# Patient Record
Sex: FEMALE | Race: WHITE | NOT HISPANIC OR LATINO
[De-identification: names, ages, dates, MRNs, and addresses within clinical notes are randomized per-mention and may not be internally consistent; named-entity substitution may affect disease eponyms.]

---

## 2018-06-23 ENCOUNTER — TRANSCRIPTION ENCOUNTER (OUTPATIENT)
Age: 58
End: 2018-06-23

## 2018-06-23 ENCOUNTER — INPATIENT (INPATIENT)
Facility: HOSPITAL | Age: 58
LOS: 1 days | Discharge: HOME | DRG: 358 | End: 2018-06-25
Attending: SURGERY | Admitting: SURGERY
Payer: COMMERCIAL

## 2018-06-23 VITALS
SYSTOLIC BLOOD PRESSURE: 121 MMHG | WEIGHT: 190.04 LBS | RESPIRATION RATE: 16 BRPM | DIASTOLIC BLOOD PRESSURE: 72 MMHG | HEART RATE: 115 BPM | HEIGHT: 65 IN | OXYGEN SATURATION: 97 % | TEMPERATURE: 99 F

## 2018-06-23 DIAGNOSIS — Z46.51 ENCOUNTER FOR FITTING AND ADJUSTMENT OF GASTRIC LAP BAND: Chronic | ICD-10-CM

## 2018-06-23 LAB
ALBUMIN SERPL ELPH-MCNC: 4.2 G/DL — SIGNIFICANT CHANGE UP (ref 3.3–5)
ALP SERPL-CCNC: 80 U/L — SIGNIFICANT CHANGE UP (ref 30–120)
ALT FLD-CCNC: 30 U/L DA — SIGNIFICANT CHANGE UP (ref 10–60)
AMYLASE P1 CFR SERPL: 84 U/L — SIGNIFICANT CHANGE UP (ref 25–125)
ANION GAP SERPL CALC-SCNC: 13 MMOL/L — SIGNIFICANT CHANGE UP (ref 5–17)
APTT BLD: 61.9 SEC — HIGH (ref 27.5–37.4)
AST SERPL-CCNC: 25 U/L — SIGNIFICANT CHANGE UP (ref 10–40)
BASOPHILS # BLD AUTO: 0.1 K/UL — SIGNIFICANT CHANGE UP (ref 0–0.2)
BASOPHILS NFR BLD AUTO: 0.9 % — SIGNIFICANT CHANGE UP (ref 0–2)
BILIRUB SERPL-MCNC: 0.7 MG/DL — SIGNIFICANT CHANGE UP (ref 0.2–1.2)
BUN SERPL-MCNC: 16 MG/DL — SIGNIFICANT CHANGE UP (ref 7–23)
CALCIUM SERPL-MCNC: 10 MG/DL — SIGNIFICANT CHANGE UP (ref 8.4–10.5)
CHLORIDE SERPL-SCNC: 103 MMOL/L — SIGNIFICANT CHANGE UP (ref 96–108)
CO2 SERPL-SCNC: 24 MMOL/L — SIGNIFICANT CHANGE UP (ref 22–31)
CREAT SERPL-MCNC: 0.89 MG/DL — SIGNIFICANT CHANGE UP (ref 0.5–1.3)
EOSINOPHIL # BLD AUTO: 0 K/UL — SIGNIFICANT CHANGE UP (ref 0–0.5)
EOSINOPHIL NFR BLD AUTO: 0.1 % — SIGNIFICANT CHANGE UP (ref 0–6)
GLUCOSE SERPL-MCNC: 114 MG/DL — HIGH (ref 70–99)
HCT VFR BLD CALC: 41.1 % — SIGNIFICANT CHANGE UP (ref 34.5–45)
HGB BLD-MCNC: 13.4 G/DL — SIGNIFICANT CHANGE UP (ref 11.5–15.5)
INR BLD: 1.09 RATIO — SIGNIFICANT CHANGE UP (ref 0.88–1.16)
LIDOCAIN IGE QN: 116 U/L — SIGNIFICANT CHANGE UP (ref 73–393)
LYMPHOCYTES # BLD AUTO: 1.4 K/UL — SIGNIFICANT CHANGE UP (ref 1–3.3)
LYMPHOCYTES # BLD AUTO: 13.5 % — SIGNIFICANT CHANGE UP (ref 13–44)
MCHC RBC-ENTMCNC: 28.8 PG — SIGNIFICANT CHANGE UP (ref 27–34)
MCHC RBC-ENTMCNC: 32.7 GM/DL — SIGNIFICANT CHANGE UP (ref 32–36)
MCV RBC AUTO: 88.2 FL — SIGNIFICANT CHANGE UP (ref 80–100)
MONOCYTES # BLD AUTO: 0.4 K/UL — SIGNIFICANT CHANGE UP (ref 0–0.9)
MONOCYTES NFR BLD AUTO: 3.6 % — SIGNIFICANT CHANGE UP (ref 2–14)
NEUTROPHILS # BLD AUTO: 8.8 K/UL — HIGH (ref 1.8–7.4)
NEUTROPHILS NFR BLD AUTO: 81.9 % — HIGH (ref 43–77)
PLATELET # BLD AUTO: 398 K/UL — SIGNIFICANT CHANGE UP (ref 150–400)
POTASSIUM SERPL-MCNC: 4 MMOL/L — SIGNIFICANT CHANGE UP (ref 3.5–5.3)
POTASSIUM SERPL-SCNC: 4 MMOL/L — SIGNIFICANT CHANGE UP (ref 3.5–5.3)
PROT SERPL-MCNC: 8.3 G/DL — SIGNIFICANT CHANGE UP (ref 6–8.3)
PROTHROM AB SERPL-ACNC: 11.9 SEC — SIGNIFICANT CHANGE UP (ref 9.8–12.7)
RBC # BLD: 4.66 M/UL — SIGNIFICANT CHANGE UP (ref 3.8–5.2)
RBC # FLD: 11.5 % — SIGNIFICANT CHANGE UP (ref 10.3–14.5)
SODIUM SERPL-SCNC: 140 MMOL/L — SIGNIFICANT CHANGE UP (ref 135–145)
WBC # BLD: 10.7 K/UL — HIGH (ref 3.8–10.5)
WBC # FLD AUTO: 10.7 K/UL — HIGH (ref 3.8–10.5)

## 2018-06-23 PROCEDURE — 74019 RADEX ABDOMEN 2 VIEWS: CPT | Mod: 26

## 2018-06-23 RX ORDER — IOHEXOL 300 MG/ML
30 INJECTION, SOLUTION INTRAVENOUS ONCE
Qty: 0 | Refills: 0 | Status: COMPLETED | OUTPATIENT
Start: 2018-06-23 | End: 2018-06-23

## 2018-06-23 RX ORDER — SODIUM CHLORIDE 9 MG/ML
1000 INJECTION INTRAMUSCULAR; INTRAVENOUS; SUBCUTANEOUS ONCE
Qty: 0 | Refills: 0 | Status: COMPLETED | OUTPATIENT
Start: 2018-06-23 | End: 2018-06-23

## 2018-06-23 RX ADMIN — SODIUM CHLORIDE 1000 MILLILITER(S): 9 INJECTION INTRAMUSCULAR; INTRAVENOUS; SUBCUTANEOUS at 22:46

## 2018-06-23 RX ADMIN — IOHEXOL 30 MILLILITER(S): 300 INJECTION, SOLUTION INTRAVENOUS at 23:06

## 2018-06-23 NOTE — ED PROVIDER NOTE - NS_ ATTENDINGSCRIBEDETAILS _ED_A_ED_FT
Kashif Espinosa MD - The scribe's documentation has been prepared under my direction and personally reviewed by me in its entirety. I confirm that the note above accurately reflects all work, treatment, procedures, and medical decision making performed by me.

## 2018-06-24 ENCOUNTER — RESULT REVIEW (OUTPATIENT)
Age: 58
End: 2018-06-24

## 2018-06-24 DIAGNOSIS — K95.09 OTHER COMPLICATIONS OF GASTRIC BAND PROCEDURE: ICD-10-CM

## 2018-06-24 DIAGNOSIS — E66.9 OBESITY, UNSPECIFIED: ICD-10-CM

## 2018-06-24 DIAGNOSIS — E66.01 MORBID (SEVERE) OBESITY DUE TO EXCESS CALORIES: ICD-10-CM

## 2018-06-24 DIAGNOSIS — Z98.84 BARIATRIC SURGERY STATUS: Chronic | ICD-10-CM

## 2018-06-24 DIAGNOSIS — T78.3XXA ANGIONEUROTIC EDEMA, INITIAL ENCOUNTER: ICD-10-CM

## 2018-06-24 LAB — BLD GP AB SCN SERPL QL: SIGNIFICANT CHANGE UP

## 2018-06-24 PROCEDURE — 43774 LAP RMVL GASTR ADJ ALL PARTS: CPT

## 2018-06-24 PROCEDURE — S2083 ADJUSTMENT GASTRIC BAND: CPT

## 2018-06-24 PROCEDURE — 99285 EMERGENCY DEPT VISIT HI MDM: CPT

## 2018-06-24 PROCEDURE — 74177 CT ABD & PELVIS W/CONTRAST: CPT | Mod: 26

## 2018-06-24 PROCEDURE — 99223 1ST HOSP IP/OBS HIGH 75: CPT | Mod: 57

## 2018-06-24 PROCEDURE — 88300 SURGICAL PATH GROSS: CPT | Mod: 26,59

## 2018-06-24 PROCEDURE — 88302 TISSUE EXAM BY PATHOLOGIST: CPT | Mod: 26

## 2018-06-24 RX ORDER — ACETAMINOPHEN 500 MG
1000 TABLET ORAL EVERY 6 HOURS
Qty: 0 | Refills: 0 | Status: DISCONTINUED | OUTPATIENT
Start: 2018-06-25 | End: 2018-06-25

## 2018-06-24 RX ORDER — HYDROMORPHONE HYDROCHLORIDE 2 MG/ML
0.5 INJECTION INTRAMUSCULAR; INTRAVENOUS; SUBCUTANEOUS
Qty: 0 | Refills: 0 | Status: DISCONTINUED | OUTPATIENT
Start: 2018-06-24 | End: 2018-06-24

## 2018-06-24 RX ORDER — ONDANSETRON 8 MG/1
4 TABLET, FILM COATED ORAL ONCE
Qty: 0 | Refills: 0 | Status: DISCONTINUED | OUTPATIENT
Start: 2018-06-24 | End: 2018-06-24

## 2018-06-24 RX ORDER — PANTOPRAZOLE SODIUM 20 MG/1
40 TABLET, DELAYED RELEASE ORAL DAILY
Qty: 0 | Refills: 0 | Status: DISCONTINUED | OUTPATIENT
Start: 2018-06-24 | End: 2018-06-24

## 2018-06-24 RX ORDER — MORPHINE SULFATE 50 MG/1
4 CAPSULE, EXTENDED RELEASE ORAL ONCE
Qty: 0 | Refills: 0 | Status: DISCONTINUED | OUTPATIENT
Start: 2018-06-24 | End: 2018-06-24

## 2018-06-24 RX ORDER — IBUPROFEN 200 MG
800 TABLET ORAL EVERY 6 HOURS
Qty: 0 | Refills: 0 | Status: DISCONTINUED | OUTPATIENT
Start: 2018-06-24 | End: 2018-06-25

## 2018-06-24 RX ORDER — ENOXAPARIN SODIUM 100 MG/ML
40 INJECTION SUBCUTANEOUS EVERY 12 HOURS
Qty: 0 | Refills: 0 | Status: DISCONTINUED | OUTPATIENT
Start: 2018-06-24 | End: 2018-06-25

## 2018-06-24 RX ORDER — HYOSCYAMINE SULFATE 0.13 MG
0.12 TABLET ORAL EVERY 6 HOURS
Qty: 0 | Refills: 0 | Status: DISCONTINUED | OUTPATIENT
Start: 2018-06-24 | End: 2018-06-25

## 2018-06-24 RX ORDER — ONDANSETRON 8 MG/1
4 TABLET, FILM COATED ORAL EVERY 6 HOURS
Qty: 0 | Refills: 0 | Status: DISCONTINUED | OUTPATIENT
Start: 2018-06-24 | End: 2018-06-25

## 2018-06-24 RX ORDER — SODIUM CHLORIDE 9 MG/ML
1000 INJECTION INTRAMUSCULAR; INTRAVENOUS; SUBCUTANEOUS
Qty: 0 | Refills: 0 | Status: DISCONTINUED | OUTPATIENT
Start: 2018-06-24 | End: 2018-06-24

## 2018-06-24 RX ORDER — ONDANSETRON 8 MG/1
4 TABLET, FILM COATED ORAL ONCE
Qty: 0 | Refills: 0 | Status: COMPLETED | OUTPATIENT
Start: 2018-06-24 | End: 2018-06-24

## 2018-06-24 RX ORDER — FAMOTIDINE 10 MG/ML
20 INJECTION INTRAVENOUS EVERY 12 HOURS
Qty: 0 | Refills: 0 | Status: DISCONTINUED | OUTPATIENT
Start: 2018-06-24 | End: 2018-06-25

## 2018-06-24 RX ORDER — HYDROMORPHONE HYDROCHLORIDE 2 MG/ML
0.5 INJECTION INTRAMUSCULAR; INTRAVENOUS; SUBCUTANEOUS EVERY 4 HOURS
Qty: 0 | Refills: 0 | Status: DISCONTINUED | OUTPATIENT
Start: 2018-06-24 | End: 2018-06-25

## 2018-06-24 RX ORDER — SODIUM CHLORIDE 9 MG/ML
1000 INJECTION, SOLUTION INTRAVENOUS
Qty: 0 | Refills: 0 | Status: DISCONTINUED | OUTPATIENT
Start: 2018-06-24 | End: 2018-06-25

## 2018-06-24 RX ORDER — FAMOTIDINE 10 MG/ML
20 INJECTION INTRAVENOUS ONCE
Qty: 0 | Refills: 0 | Status: COMPLETED | OUTPATIENT
Start: 2018-06-24 | End: 2018-06-24

## 2018-06-24 RX ORDER — DIPHENHYDRAMINE HCL 50 MG
50 CAPSULE ORAL EVERY 6 HOURS
Qty: 0 | Refills: 0 | Status: DISCONTINUED | OUTPATIENT
Start: 2018-06-24 | End: 2018-06-25

## 2018-06-24 RX ORDER — SODIUM CHLORIDE 9 MG/ML
1000 INJECTION, SOLUTION INTRAVENOUS
Qty: 0 | Refills: 0 | Status: DISCONTINUED | OUTPATIENT
Start: 2018-06-24 | End: 2018-06-24

## 2018-06-24 RX ORDER — ACETAMINOPHEN 500 MG
1000 TABLET ORAL EVERY 6 HOURS
Qty: 0 | Refills: 0 | Status: COMPLETED | OUTPATIENT
Start: 2018-06-24 | End: 2018-06-25

## 2018-06-24 RX ORDER — DIPHENHYDRAMINE HCL 50 MG
50 CAPSULE ORAL ONCE
Qty: 0 | Refills: 0 | Status: COMPLETED | OUTPATIENT
Start: 2018-06-24 | End: 2018-06-24

## 2018-06-24 RX ORDER — PANTOPRAZOLE SODIUM 20 MG/1
40 TABLET, DELAYED RELEASE ORAL DAILY
Qty: 0 | Refills: 0 | Status: DISCONTINUED | OUTPATIENT
Start: 2018-06-24 | End: 2018-06-25

## 2018-06-24 RX ADMIN — ONDANSETRON 4 MILLIGRAM(S): 8 TABLET, FILM COATED ORAL at 09:03

## 2018-06-24 RX ADMIN — FAMOTIDINE 20 MILLIGRAM(S): 10 INJECTION INTRAVENOUS at 22:53

## 2018-06-24 RX ADMIN — ENOXAPARIN SODIUM 40 MILLIGRAM(S): 100 INJECTION SUBCUTANEOUS at 19:11

## 2018-06-24 RX ADMIN — SODIUM CHLORIDE 100 MILLILITER(S): 9 INJECTION, SOLUTION INTRAVENOUS at 16:06

## 2018-06-24 RX ADMIN — SODIUM CHLORIDE 125 MILLILITER(S): 9 INJECTION INTRAMUSCULAR; INTRAVENOUS; SUBCUTANEOUS at 04:11

## 2018-06-24 RX ADMIN — HYDROMORPHONE HYDROCHLORIDE 0.5 MILLIGRAM(S): 2 INJECTION INTRAMUSCULAR; INTRAVENOUS; SUBCUTANEOUS at 16:37

## 2018-06-24 RX ADMIN — ONDANSETRON 4 MILLIGRAM(S): 8 TABLET, FILM COATED ORAL at 17:15

## 2018-06-24 RX ADMIN — SODIUM CHLORIDE 125 MILLILITER(S): 9 INJECTION INTRAMUSCULAR; INTRAVENOUS; SUBCUTANEOUS at 09:03

## 2018-06-24 RX ADMIN — HYDROMORPHONE HYDROCHLORIDE 0.5 MILLIGRAM(S): 2 INJECTION INTRAMUSCULAR; INTRAVENOUS; SUBCUTANEOUS at 16:19

## 2018-06-24 RX ADMIN — SODIUM CHLORIDE 100 MILLILITER(S): 9 INJECTION, SOLUTION INTRAVENOUS at 22:53

## 2018-06-24 RX ADMIN — Medication 400 MILLIGRAM(S): at 21:14

## 2018-06-24 RX ADMIN — MORPHINE SULFATE 4 MILLIGRAM(S): 50 CAPSULE, EXTENDED RELEASE ORAL at 00:08

## 2018-06-24 RX ADMIN — HYDROMORPHONE HYDROCHLORIDE 0.5 MILLIGRAM(S): 2 INJECTION INTRAMUSCULAR; INTRAVENOUS; SUBCUTANEOUS at 16:25

## 2018-06-24 RX ADMIN — ONDANSETRON 4 MILLIGRAM(S): 8 TABLET, FILM COATED ORAL at 00:08

## 2018-06-24 RX ADMIN — PANTOPRAZOLE SODIUM 40 MILLIGRAM(S): 20 TABLET, DELAYED RELEASE ORAL at 04:17

## 2018-06-24 RX ADMIN — MORPHINE SULFATE 4 MILLIGRAM(S): 50 CAPSULE, EXTENDED RELEASE ORAL at 05:21

## 2018-06-24 RX ADMIN — HYDROMORPHONE HYDROCHLORIDE 0.5 MILLIGRAM(S): 2 INJECTION INTRAMUSCULAR; INTRAVENOUS; SUBCUTANEOUS at 16:05

## 2018-06-24 RX ADMIN — SODIUM CHLORIDE 100 MILLILITER(S): 9 INJECTION, SOLUTION INTRAVENOUS at 16:30

## 2018-06-24 RX ADMIN — Medication 50 MILLIGRAM(S): at 22:53

## 2018-06-24 NOTE — BRIEF OPERATIVE NOTE - PROCEDURE
<<-----Click on this checkbox to enter Procedure Laparoscopic lysis of adhesions  06/24/2018  around band site  Active  JHALSTEA  Laparoscopic removal of gastric band and port  06/24/2018    Active  JHALSTEA

## 2018-06-24 NOTE — H&P ADULT - ASSESSMENT
Gastric Band prolapse with nausea and vomiting  (acute vs Chronic?).  Lap Band placed by Dr Khoury in 2009.  Last seen in office in 2014.  States she has had no complications with the lap band but did have fluid removed for reflux symptoms in 2014.    4 day history of epigastric discomfort, worsening reflux, nausea and vomiting.  Difficult to tolerate diet.  Difficult tolerating even liquids.    Imaging reviewed, consistent with gastric prolapse.    Lap Band adjustment performed.  2 cc removed, completely deflating the Lap Band.  Pt still reports feeling of reflux with water test following a few sips of cold water.  Given adjustment failed to alleviate her symptoms, Lap Band removal is recommended.    Admit to my service, keep NPO.  OR arranged for morning for Laparoscopic Removal of Adjustable Gastric Band and Port.  IVF's, anti-emetics.  DVT prophylaxis ordered.    Risk, Benefits, and Alternatives to surgery have been discussed.  This includes but is not limited to bleeding, infection, damage to adjacent structures, need for additional surgery or interventions, adverse effects of anesthesia such as cardio-respiratory complications, prolonged intubation, cardiac arrhythmia, arrest, and or death.  Risks of forgoing surgery have also been discussed including progression of, and/or worsening of current condition which may then require urgent or emergent treatment or surgery.

## 2018-06-24 NOTE — PROGRESS NOTE ADULT - SUBJECTIVE AND OBJECTIVE BOX
Patient is a 58y old  Female who presents with a chief complaint of Gastric Band Prolapse (24 Jun 2018 03:46)      BRIEF HOSPITAL COURSE: 57 y/o F with a h/o morbid obesity, lap band placed in 2009, admitted with refractory n/v. CT abdo/pelvis showed slipped lap band. Lap band adjusted without alleviation of symptoms.    Events last 24 hours: Now s/p lap band removal. Asked by Dr. Abebe to assist in evaluation/treatment of patient as she began complaining of facial edema (b/l orbital regions and b/l submandibular regions). Patient denies swelling of any other areas, SOB, stridor/wheezing, drooling. Hemodynamically stable. Afebrile. Has penicillin allergy.    PAST MEDICAL & SURGICAL HISTORY:  Morbid obesity  H/O laparoscopic adjustable gastric banding      Review of Systems:  CONSTITUTIONAL: No fever, chills, or fatigue  EYES: No eye pain, visual disturbances, or discharge  ENMT:  No difficulty hearing, tinnitus, vertigo; No sinus or throat pain  NECK: No pain or stiffness  RESPIRATORY: No cough, wheezing, chills or hemoptysis; No shortness of breath  CARDIOVASCULAR: No chest pain, palpitations, dizziness, or leg swelling  GASTROINTESTINAL: No abdominal or epigastric pain. No nausea, vomiting, or hematemesis; No diarrhea or constipation. No melena or hematochezia.  GENITOURINARY: No dysuria, frequency, hematuria, or incontinence  NEUROLOGICAL: No headaches, memory loss, loss of strength, numbness, or tremors  SKIN: (+)facial edema  MUSCULOSKELETAL: No joint pain or swelling; No muscle, back, or extremity pain  PSYCHIATRIC: No depression, anxiety, mood swings, or difficulty sleeping      Medications:    diphenhydrAMINE   Injectable 50 milliGRAM(s) IV Push once  diphenhydrAMINE   Injectable 50 milliGRAM(s) IV Push every 6 hours  acetaminophen  IVPB. 1000 milliGRAM(s) IV Intermittent every 6 hours  HYDROmorphone  Injectable 0.5 milliGRAM(s) IV Push every 4 hours PRN  ibuprofen IVPB. 800 milliGRAM(s) IV Intermittent every 6 hours PRN  ondansetron Injectable 4 milliGRAM(s) IV Push every 6 hours  enoxaparin Injectable 40 milliGRAM(s) SubCutaneous every 12 hours  famotidine Injectable 20 milliGRAM(s) IV Push every 12 hours  famotidine Injectable 20 milliGRAM(s) IV Push once  hyoscyamine SL 0.125 milliGRAM(s) SubLingual every 6 hours PRN  pantoprazole  Injectable 40 milliGRAM(s) IV Push daily  lactated ringers. 1000 milliLiter(s) IV Continuous <Continuous>          ICU Vital Signs Last 24 Hrs  T(C): 36.7 (24 Jun 2018 17:44), Max: 37.5 (24 Jun 2018 04:57)  T(F): 98.1 (24 Jun 2018 17:44), Max: 99.5 (24 Jun 2018 04:57)  HR: 65 (24 Jun 2018 17:44) (65 - 89)  BP: 135/82 (24 Jun 2018 17:44) (115/72 - 165/93)  BP(mean): --  ABP: --  ABP(mean): --  RR: 18 (24 Jun 2018 17:44) (14 - 24)  SpO2: 95% (24 Jun 2018 17:44) (93% - 99%)          I&O's Detail    23 Jun 2018 07:01  -  24 Jun 2018 07:00  --------------------------------------------------------  IN:  Total IN: 0 mL    OUT:    Voided: 300 mL  Total OUT: 300 mL    Total NET: -300 mL      24 Jun 2018 07:01  -  24 Jun 2018 22:13  --------------------------------------------------------  IN:    lactated ringers.: 100 mL    lactated ringers.: 200 mL    Oral Fluid: 120 mL    sodium chloride 0.9%: 500 mL  Total IN: 920 mL    OUT:  Total OUT: 0 mL    Total NET: 920 mL            LABS:                        13.4   10.7  )-----------( 398      ( 23 Jun 2018 22:11 )             41.1     06-23    140  |  103  |  16  ----------------------------<  114<H>  4.0   |  24  |  0.89    Ca    10.0      23 Jun 2018 22:22    TPro  8.3  /  Alb  4.2  /  TBili  0.7  /  DBili  x   /  AST  25  /  ALT  30  /  AlkPhos  80  06-23          CAPILLARY BLOOD GLUCOSE        PT/INR - ( 23 Jun 2018 22:14 )   PT: 11.9 sec;   INR: 1.09 ratio         PTT - ( 23 Jun 2018 22:14 )  PTT:61.9 sec    CULTURES:      Physical Examination:    General: No acute distress.  Alert, oriented, interactive, nonfocal    HEENT: Pupils equal, reactive to light.  Symmetric.    PULM: Clear to auscultation bilaterally, no significant sputum production    CVS: Regular rate and rhythm, no murmurs, rubs, or gallops    ABD: Soft, nondistended, nontender, normoactive bowel sounds, no masses    EXT: No edema, nontender    SKIN: Warm and well perfused, b/l orbital edema and b/l submandibular edema    NEURO: A&Ox3, strength 5/5 all extremities, cranial nerves grossly intact, no focal deficits      RADIOLOGY:     < from: CT Abdomen and Pelvis w/ Oral Cont and w/ IV Cont (06.24.18 @ 01:38) >  FINDINGS:   The heart is not enlarged. The lung bases are clear.     Status post gastric lap band. The band appearsto have a slipped   configuration. There is stranding around the pouch without significant   distention. The large and small bowel are normal in caliber without   obstruction. There is no free intraperitoneal air or abdominal abscess.    The appendixis normal. There is no abnormal bowel wall thickening or   inflammatory change.    The liver, gallbladder, spleen, pancreas and adrenal glands are   unremarkable.  The kidneys enhance symmetrically without hydronephrosis.     The abdominal aorta is normal in caliber. There is no retroperitoneal,   pelvic or inguinal adenopathy. There is no ascites.    The urinary bladder is unremarkable. There is a fibroid uterus. There are   no adnexal masses.    There is a transitional vertebra at S1 with grade1 anterolisthesis of L5   on S1. There are no acute osseous abnormalities.    IMPRESSION:   Slipped gastric lap band. Mild inflammation around the pouch.        TOTAL TIME SPENT: 35 mins  Evaluating/treating patient, reviewing data/labs/imaging, discussing case with multidisciplinary team, discussing plan/goals of care with patient/family. Non-inclusive of procedure time.

## 2018-06-24 NOTE — H&P ADULT - GENITOURINARY
No brushing, rinsing or spitting for 24 hours. Soft diet today then as tolerated. OTC Motrin or Tylenol prn pain. Rafael Fischer received IV Tylenol at 8:00am. The next dose he can receive is in 4-6 hours between 12:00 pm-2:00 pm      Learning About Anesthesia for Your Child  What is anesthesia? Anesthesia controls pain during surgery or another kind of procedure. Anesthesia will help relax your child and block pain. It could also make your child sleepy or forgetful. Or it may make him or her unconscious. It depends on what kind your child gets. Your child's anesthesia provider (anesthesiologist or nurse anesthetist) will make sure your child is comfortable and safe during the procedure or surgery. There are different types of anesthesia. · Local anesthesia. This type numbs a small part of the body. Doctors use it for simple procedures. ¨ Your child will get a shot in the area the doctor will work on.  ¨ Your child may stay awake during the procedure. Or your child may get medicine to help him or her relax or sleep. · General anesthesia. This type affects the brain and the whole body. Your child may get it through a small tube placed in a vein (IV). Or he or she may breathe it in. Your child will be unconscious and won't feel pain. During the surgery, your child will be comfortable. Later, he or she will not remember much about the surgery. What can you expect after your child has anesthesia? · Right after the surgery, your child will be in the recovery room. Nurses will make sure he or she is comfortable. As the anesthesia wears off, your child may feel some pain and discomfort. · Tell someone if your child has pain. Pain medicine works better if your child takes it before the pain gets bad. · When your child first wakes up from general anesthesia, he or she may be confused. Or it may be hard for your child to think clearly.  This is normal. Your child may feel the effects of anesthesia for several hours.  · If your child had local or regional anesthesia, he or she may feel numb and have less feeling in part of his or her body. It may also take a few hours for your child to be able to move and control his or her muscles as usual.  Other common side effects of anesthesia include:  · Nausea and vomiting. This does not usually last long. It can be treated with medicine. · A slight drop in body temperature. Your child may feel cold and shiver when he or she wakes up. · A sore throat, if your child had general anesthesia. · Muscle aches or weakness. · Feeling tired. After minor surgery, your child may go home the same day. After other types of surgery, your child may stay in the hospital. Your doctor will check on your child's recovery from the anesthesia and answer any questions. Follow-up care is a key part of your child's treatment and safety. Be sure to make and go to all appointments, and call your doctor if your child is having problems. It's also a good idea to know your child's test results and keep a list of the medicines your child takes. Where can you learn more? Go to http://tex-lyudmila.info/. Enter 76 [id number] [id number] in the search box to learn more about \"Learning About Anesthesia for Your Child. \"  Current as of: August 14, 2016  Content Version: 11.4  © 2506-5917 [employer]. Care instructions adapted under license by [employer] (which disclaims liability or warranty for this information). If you have questions about a medical condition or this instruction, always ask your healthcare professional. [place] [address] any warranty or liability for your use of this information. negative

## 2018-06-24 NOTE — BRIEF OPERATIVE NOTE - PRE-OP DX
Dysphagia  06/24/2018  S/P Prior lap band, tubing and port placement - now slipped  Active  Laly Carvajal

## 2018-06-24 NOTE — H&P ADULT - NSHPLABSRESULTS_GEN_ALL_CORE
13.4   10.7  )-----------( 398      ( 23 Jun 2018 22:11 )             41.1     06-23    140  |  103  |  16  ----------------------------<  114<H>  4.0   |  24  |  0.89    Ca    10.0      23 Jun 2018 22:22    TPro  8.3  /  Alb  4.2  /  TBili  0.7  /  DBili  x   /  AST  25  /  ALT  30  /  AlkPhos  80  06-23    PT/INR - ( 23 Jun 2018 22:14 )   PT: 11.9 sec;   INR: 1.09 ratio         PTT - ( 23 Jun 2018 22:14 )  PTT:61.9 sec        EXAM:  CT ABDOMEN AND PELVIS OC IC                                  PROCEDURE DATE:  06/24/2018          INTERPRETATION:  CLINICAL HISTORY: Vomiting. History of gastric lap band    TECHNIQUE:  CT scan of the abdomen and pelvis with IV contrast.  Transaxial images were acquired from the domes of the diaphragm to the   symphysis pubis with intravenous contrast. Oral contrast was withheld.   Coronal and sagittal images were also provided from the transaxial source   data. 90mLs of Omnipaque 300 was administered intravenously without   complication and 10 mLs was discarded.    COMPARISON: There is no prior study for comparison.    FINDINGS:   The heart is not enlarged. The lung bases are clear.     Status post gastric lap band. The band appearsto have a slipped   configuration. There is stranding around the pouch without significant   distention. The large and small bowel are normal in caliber without   obstruction. There is no free intraperitoneal air or abdominal abscess.    The appendixis normal. There is no abnormal bowel wall thickening or   inflammatory change.    The liver, gallbladder, spleen, pancreas and adrenal glands are   unremarkable.  The kidneys enhance symmetrically without hydronephrosis.     The abdominal aorta is normal in caliber. There is no retroperitoneal,   pelvic or inguinal adenopathy. There is no ascites.    The urinary bladder is unremarkable. There is a fibroid uterus. There are   no adnexal masses.    There is a transitional vertebra at S1 with grade1 anterolisthesis of L5   on S1. There are no acute osseous abnormalities.    IMPRESSION:   Slipped gastric lap band. Mild inflammation around the pouch.    THOMAS ROOT M.D., RADIOLOGIST  This document has been electronically signed. Jun 24 2018  2:15AM

## 2018-06-24 NOTE — PROGRESS NOTE ADULT - ASSESSMENT
57 y/o F with a h/o morbid obesity, lap band placed in 2009, with angioedema, gastric band slippage.    Case discussed in detail with hospitalist, Dr. Abebe.

## 2018-06-24 NOTE — H&P ADULT - FAMILY HISTORY
Father  Still living? Unknown  Family history of ischemic heart disease, Age at diagnosis: Age Unknown     Mother  Still living? Unknown  Family history of lung cancer, Age at diagnosis: Age Unknown

## 2018-06-24 NOTE — PROGRESS NOTE ADULT - PROBLEM SELECTOR PLAN 1
Precipitating factor not clear- rare cross reactivity between clindamycin and penicillins. Received contrast yesterday. Opioid pain meds? No oropharyngeal edema, tongue and lips at baseline, airway patent, no respiratory difficulty whatsoever. Will give STAT doses of IV Benadryl and famotidine and begin courses of each. Monitor closely. Please consult ICU formally if condition worsens and airway safety becomes a concern.

## 2018-06-24 NOTE — H&P ADULT - GASTROINTESTINAL DETAILS
no bruit/no rebound tenderness/no rigidity/no distention/no guarding/no organomegaly/bowel sounds normal/nontender/soft

## 2018-06-25 ENCOUNTER — TRANSCRIPTION ENCOUNTER (OUTPATIENT)
Age: 58
End: 2018-06-25

## 2018-06-25 VITALS
DIASTOLIC BLOOD PRESSURE: 73 MMHG | SYSTOLIC BLOOD PRESSURE: 115 MMHG | RESPIRATION RATE: 17 BRPM | OXYGEN SATURATION: 98 % | TEMPERATURE: 99 F | HEART RATE: 69 BPM

## 2018-06-25 LAB
ANION GAP SERPL CALC-SCNC: 9 MMOL/L — SIGNIFICANT CHANGE UP (ref 5–17)
BUN SERPL-MCNC: 9 MG/DL — SIGNIFICANT CHANGE UP (ref 7–23)
CALCIUM SERPL-MCNC: 9 MG/DL — SIGNIFICANT CHANGE UP (ref 8.4–10.5)
CHLORIDE SERPL-SCNC: 106 MMOL/L — SIGNIFICANT CHANGE UP (ref 96–108)
CO2 SERPL-SCNC: 27 MMOL/L — SIGNIFICANT CHANGE UP (ref 22–31)
CREAT SERPL-MCNC: 0.78 MG/DL — SIGNIFICANT CHANGE UP (ref 0.5–1.3)
GLUCOSE SERPL-MCNC: 104 MG/DL — HIGH (ref 70–99)
HCT VFR BLD CALC: 35 % — SIGNIFICANT CHANGE UP (ref 34.5–45)
HGB BLD-MCNC: 11.6 G/DL — SIGNIFICANT CHANGE UP (ref 11.5–15.5)
MCHC RBC-ENTMCNC: 29.5 PG — SIGNIFICANT CHANGE UP (ref 27–34)
MCHC RBC-ENTMCNC: 33.3 GM/DL — SIGNIFICANT CHANGE UP (ref 32–36)
MCV RBC AUTO: 88.6 FL — SIGNIFICANT CHANGE UP (ref 80–100)
PLATELET # BLD AUTO: 313 K/UL — SIGNIFICANT CHANGE UP (ref 150–400)
POTASSIUM SERPL-MCNC: 4.1 MMOL/L — SIGNIFICANT CHANGE UP (ref 3.5–5.3)
POTASSIUM SERPL-SCNC: 4.1 MMOL/L — SIGNIFICANT CHANGE UP (ref 3.5–5.3)
RBC # BLD: 3.95 M/UL — SIGNIFICANT CHANGE UP (ref 3.8–5.2)
RBC # FLD: 11.7 % — SIGNIFICANT CHANGE UP (ref 10.3–14.5)
SODIUM SERPL-SCNC: 142 MMOL/L — SIGNIFICANT CHANGE UP (ref 135–145)
WBC # BLD: 12.2 K/UL — HIGH (ref 3.8–10.5)
WBC # FLD AUTO: 12.2 K/UL — HIGH (ref 3.8–10.5)

## 2018-06-25 PROCEDURE — 74220 X-RAY XM ESOPHAGUS 1CNTRST: CPT

## 2018-06-25 PROCEDURE — 88300 SURGICAL PATH GROSS: CPT

## 2018-06-25 PROCEDURE — 80053 COMPREHEN METABOLIC PANEL: CPT

## 2018-06-25 PROCEDURE — 86901 BLOOD TYPING SEROLOGIC RH(D): CPT

## 2018-06-25 PROCEDURE — 82150 ASSAY OF AMYLASE: CPT

## 2018-06-25 PROCEDURE — 85730 THROMBOPLASTIN TIME PARTIAL: CPT

## 2018-06-25 PROCEDURE — 99285 EMERGENCY DEPT VISIT HI MDM: CPT | Mod: 25

## 2018-06-25 PROCEDURE — 36415 COLL VENOUS BLD VENIPUNCTURE: CPT

## 2018-06-25 PROCEDURE — 74019 RADEX ABDOMEN 2 VIEWS: CPT

## 2018-06-25 PROCEDURE — 85027 COMPLETE CBC AUTOMATED: CPT

## 2018-06-25 PROCEDURE — 96375 TX/PRO/DX INJ NEW DRUG ADDON: CPT

## 2018-06-25 PROCEDURE — 86850 RBC ANTIBODY SCREEN: CPT

## 2018-06-25 PROCEDURE — 83690 ASSAY OF LIPASE: CPT

## 2018-06-25 PROCEDURE — 74220 X-RAY XM ESOPHAGUS 1CNTRST: CPT | Mod: 26

## 2018-06-25 PROCEDURE — 80048 BASIC METABOLIC PNL TOTAL CA: CPT

## 2018-06-25 PROCEDURE — 86900 BLOOD TYPING SEROLOGIC ABO: CPT

## 2018-06-25 PROCEDURE — 74177 CT ABD & PELVIS W/CONTRAST: CPT

## 2018-06-25 PROCEDURE — 85610 PROTHROMBIN TIME: CPT

## 2018-06-25 PROCEDURE — 96374 THER/PROPH/DIAG INJ IV PUSH: CPT

## 2018-06-25 PROCEDURE — 88302 TISSUE EXAM BY PATHOLOGIST: CPT

## 2018-06-25 RX ORDER — LAMOTRIGINE 25 MG/1
0 TABLET, ORALLY DISINTEGRATING ORAL
Qty: 0 | Refills: 0 | COMMUNITY

## 2018-06-25 RX ORDER — CITALOPRAM 10 MG/1
1 TABLET, FILM COATED ORAL
Qty: 0 | Refills: 0 | COMMUNITY

## 2018-06-25 RX ORDER — OMEPRAZOLE 10 MG/1
1 CAPSULE, DELAYED RELEASE ORAL
Qty: 30 | Refills: 2 | OUTPATIENT
Start: 2018-06-25 | End: 2018-09-22

## 2018-06-25 RX ORDER — ONDANSETRON 8 MG/1
1 TABLET, FILM COATED ORAL
Qty: 15 | Refills: 0 | OUTPATIENT
Start: 2018-06-25

## 2018-06-25 RX ADMIN — FAMOTIDINE 20 MILLIGRAM(S): 10 INJECTION INTRAVENOUS at 10:50

## 2018-06-25 RX ADMIN — ONDANSETRON 4 MILLIGRAM(S): 8 TABLET, FILM COATED ORAL at 11:32

## 2018-06-25 RX ADMIN — Medication 400 MILLIGRAM(S): at 08:47

## 2018-06-25 RX ADMIN — HYDROMORPHONE HYDROCHLORIDE 0.5 MILLIGRAM(S): 2 INJECTION INTRAMUSCULAR; INTRAVENOUS; SUBCUTANEOUS at 11:32

## 2018-06-25 RX ADMIN — SODIUM CHLORIDE 100 MILLILITER(S): 9 INJECTION, SOLUTION INTRAVENOUS at 05:09

## 2018-06-25 RX ADMIN — PANTOPRAZOLE SODIUM 40 MILLIGRAM(S): 20 TABLET, DELAYED RELEASE ORAL at 10:50

## 2018-06-25 RX ADMIN — ONDANSETRON 4 MILLIGRAM(S): 8 TABLET, FILM COATED ORAL at 05:08

## 2018-06-25 RX ADMIN — ONDANSETRON 4 MILLIGRAM(S): 8 TABLET, FILM COATED ORAL at 00:22

## 2018-06-25 RX ADMIN — Medication 1000 MILLIGRAM(S): at 08:52

## 2018-06-25 RX ADMIN — ENOXAPARIN SODIUM 40 MILLIGRAM(S): 100 INJECTION SUBCUTANEOUS at 06:11

## 2018-06-25 RX ADMIN — Medication 400 MILLIGRAM(S): at 03:10

## 2018-06-25 RX ADMIN — HYDROMORPHONE HYDROCHLORIDE 0.5 MILLIGRAM(S): 2 INJECTION INTRAMUSCULAR; INTRAVENOUS; SUBCUTANEOUS at 11:47

## 2018-06-25 NOTE — PROGRESS NOTE ADULT - SUBJECTIVE AND OBJECTIVE BOX
BARIATRIC SURGERY     Pre-Op Dx: Morbid obesity/Bariatric Surgery Status.  Procedure: s/p removal of lap band and port secondary to gastric prolapse..  Surgeon: Kamron KEBEDE    Subjective:    58y Female post op day # 1 s/p removal of lap band and port secondary to gastric prolapse. Minimal incisional discomfort. Minimal gas discomfort. Denies any nausea or vomiting. Plan to start bariatric clear diet as tolerated this am after completion of video esophagram. Pt has been NPO since midnight. Ambulating on Floor/ Voided this am./ Utilizing incentive spirometry as instructed.     LABS:                        11.6   12.2  )-----------( 313      ( 25 Jun 2018 07:03 )             35.0     06-25    142  |  106  |  9   ----------------------------<  104<H>  4.1   |  27  |  0.78    Ca    9.0      25 Jun 2018 07:05    TPro  8.3  /  Alb  4.2  /  TBili  0.7  /  DBili  x   /  AST  25  /  ALT  30  /  AlkPhos  80  06-23    PT/INR - ( 23 Jun 2018 22:14 )   PT: 11.9 sec;   INR: 1.09 ratio         PTT - ( 23 Jun 2018 22:14 )  PTT:61.9 sec  CAPILLARY BLOOD GLUCOSE          LIVER FUNCTIONS - ( 23 Jun 2018 22:22 )  Alb: 4.2 g/dL / Pro: 8.3 g/dL / ALK PHOS: 80 U/L / ALT: 30 U/L DA / AST: 25 U/L / GGT: x               Vital Signs Last 24 Hrs  T(C): 36.7 (25 Jun 2018 07:12), Max: 37.2 (24 Jun 2018 09:40)  T(F): 98.1 (25 Jun 2018 07:12), Max: 98.9 (24 Jun 2018 09:40)  HR: 67 (25 Jun 2018 07:12) (63 - 115)  BP: 135/79 (25 Jun 2018 07:12) (112/72 - 160/83)  BP(mean): --  RR: 17 (25 Jun 2018 07:12) (14 - 24)  SpO2: 99% (25 Jun 2018 07:12) (93% - 99%)    06-24 @ 07:01  -  06-25 @ 07:00  --------------------------------------------------------  IN: 920 mL / OUT: 0 mL / NET: 920 mL        Physical Exam:  General: Alert & Oriented x 3.  NAD, resting comfortably in bed.    Abdominal: Soft - Non tender - No swelling noted. Incision site clean / dry /intact. Normoactive Bowel Sounds.  Extremities: No swelling/ edema / erythema noted bilateral upper / lower extremities.  Neuro: Motor / Sensory function grossly intact bilateral lower/ upper extremities.          Assessment: 58 y  Female Post OP Day # 1 S/P removal of lap band and port secondary to gastric prolapse.  Pt is hemodynamically stable.    Video Esophagram performed this am - No obstruction No extravasation        Plan:  Cont GI / DVT prophylaxis.   Dietician consult.   Cont  OOB / ambulation on floor / incentive spirometry.  Plan to start bariatric clear diet as tolerated  advance to full liquids at home.  Discussed and reviewed home meds  and pain medication with patient . Discussed medication that requires crushing.  Plan to begin protein shakes at home.  Possibly discharged later today or tomorrow.  Dr. Lundy  saw pt.

## 2018-06-25 NOTE — DISCHARGE NOTE ADULT - PATIENT PORTAL LINK FT
You can access the [facility] Patient Portal, offered by [facility], by registering with the following website: [email or web address]

## 2018-06-25 NOTE — DISCHARGE NOTE ADULT - PLAN OF CARE
Resolution of gastric prolapse and tolerate foods S/p laparoscopic removal of lap band and port. Instructed to ambulate and use incentive spirometry frequently. Ice packs to abdominal wall and shoulders as needed for discomfort. Pain meds as needed. Stay of full liquid diet until follow up with Dr. Lundy in 1 week.

## 2018-06-25 NOTE — DISCHARGE NOTE ADULT - CARE PLAN
Principal Discharge DX:	Gastric band slippage  Goal:	Resolution of gastric prolapse and tolerate foods  Assessment and plan of treatment:	S/p laparoscopic removal of lap band and port. Instructed to ambulate and use incentive spirometry frequently. Ice packs to abdominal wall and shoulders as needed for discomfort. Pain meds as needed. Stay of full liquid diet until follow up with Dr. Lundy in 1 week.

## 2018-06-25 NOTE — DISCHARGE NOTE ADULT - INSTRUCTIONS
Instructed to ambulate and use incentive spirometry frequently. Ice packs to abdominal wall and shoulders as needed for discomfort. Pain meds as needed. Stay of full liquid diet until follow up with Dr. Lundy in 1 week.

## 2018-06-25 NOTE — PROGRESS NOTE ADULT - ATTENDING COMMENTS
Agree with above.  POD #1 s/p laparoscopic removal of Lap Band and port for gastric prolapse.  Patient seen and examined.  Tolerating clears, good urine output, ambulating. VE resolution of prolapse. Probable discharge home later today.  Follow up in the office nest week, call with any concerns.

## 2018-06-25 NOTE — DISCHARGE NOTE ADULT - CARE PROVIDER_API CALL
Yves Lundy), Surgery  [address] [place] [address]  Phone: [phone]  Fax: [phone]

## 2018-06-25 NOTE — DISCHARGE NOTE ADULT - MEDICATION SUMMARY - MEDICATIONS TO TAKE
I will START or STAY ON the medications listed below when I get home from the hospital:    oxyCODONE-acetaminophen 5 mg-325 mg oral tablet  -- 1 tab(s) by mouth every 6 hours, As Needed -for moderate pain, crush and put in low fat yogurt or pudding.  MDD:4 tabs  -- Caution federal law prohibits the transfer of this drug to any person other  than the person for whom it was prescribed.  May cause drowsiness.  Alcohol may intensify this effect.  Use care when operating dangerous machinery.  This prescription cannot be refilled.  This product contains acetaminophen.  Do not use  with any other product containing acetaminophen to prevent possible liver damage.  Using more of this medication than prescribed may cause serious breathing problems.    -- Indication: For s/p lap band removal     lamoTRIgine  -- orally once a day, if larger than tic tac, crush and put in low fat yogurt or pudding.   -- Indication: For per PMD    CeleXA 40 mg oral tablet  -- 1 tab(s) by mouth once a day, if larger than a tic tac, crush and put in low fat yogurt or pudding.   -- Indication: For Depression    ondansetron 4 mg oral tablet, disintegrating  -- 1 tab(s) by mouth every 8 hours, As Needed -for nausea   -- Indication: For s/p lap band removal     omeprazole 20 mg oral delayed release capsule  -- 1 cap(s) by mouth once a day, open and put contents in low fat yogurt or pudding   -- It is very important that you take or use this exactly as directed.  Do not skip doses or discontinue unless directed by your doctor.  Obtain medical advice before taking any non-prescription drugs as some may affect the action of this medication.  Swallow whole.  Do not crush.    -- Indication: For s/p lap band removal

## 2018-06-25 NOTE — DISCHARGE NOTE ADULT - NS AS ACTIVITY OBS
Do not drive or operate machinery/Do not make important decisions/Showering allowed/Walking-Indoors allowed/No Heavy lifting/straining/Stairs allowed

## 2018-06-26 ENCOUNTER — MESSAGE (OUTPATIENT)
Age: 58
End: 2018-06-26

## 2018-07-05 ENCOUNTER — APPOINTMENT (OUTPATIENT)
Dept: BARIATRICS | Facility: CLINIC | Age: 58
End: 2018-07-05
Payer: MEDICARE

## 2018-07-05 VITALS
OXYGEN SATURATION: 97 % | DIASTOLIC BLOOD PRESSURE: 69 MMHG | SYSTOLIC BLOOD PRESSURE: 111 MMHG | HEIGHT: 66 IN | BODY MASS INDEX: 32.53 KG/M2 | WEIGHT: 202.38 LBS | HEART RATE: 83 BPM

## 2018-07-05 DIAGNOSIS — Z82.49 FAMILY HISTORY OF ISCHEMIC HEART DISEASE AND OTHER DISEASES OF THE CIRCULATORY SYSTEM: ICD-10-CM

## 2018-07-05 DIAGNOSIS — Z87.891 PERSONAL HISTORY OF NICOTINE DEPENDENCE: ICD-10-CM

## 2018-07-05 DIAGNOSIS — Z78.9 OTHER SPECIFIED HEALTH STATUS: ICD-10-CM

## 2018-07-05 DIAGNOSIS — Z80.1 FAMILY HISTORY OF MALIGNANT NEOPLASM OF TRACHEA, BRONCHUS AND LUNG: ICD-10-CM

## 2018-07-05 DIAGNOSIS — Z87.898 PERSONAL HISTORY OF OTHER SPECIFIED CONDITIONS: ICD-10-CM

## 2018-07-05 DIAGNOSIS — K95.09 OTHER COMPLICATIONS OF GASTRIC BAND PROCEDURE: ICD-10-CM

## 2018-07-05 DIAGNOSIS — E78.00 PURE HYPERCHOLESTEROLEMIA, UNSPECIFIED: ICD-10-CM

## 2018-07-05 PROCEDURE — 99024 POSTOP FOLLOW-UP VISIT: CPT

## 2018-07-05 RX ORDER — CITALOPRAM 40 MG/1
40 TABLET, FILM COATED ORAL
Refills: 0 | Status: ACTIVE | COMMUNITY

## 2018-07-05 RX ORDER — LAMOTRIGINE 200 MG/1
200 TABLET ORAL
Refills: 0 | Status: ACTIVE | COMMUNITY

## 2018-07-10 ENCOUNTER — APPOINTMENT (OUTPATIENT)
Dept: BARIATRICS | Facility: CLINIC | Age: 58
End: 2018-07-10
Payer: MEDICARE

## 2018-07-10 VITALS
WEIGHT: 193.12 LBS | BODY MASS INDEX: 31.04 KG/M2 | DIASTOLIC BLOOD PRESSURE: 88 MMHG | SYSTOLIC BLOOD PRESSURE: 111 MMHG | OXYGEN SATURATION: 97 % | HEART RATE: 94 BPM | HEIGHT: 66 IN

## 2018-07-10 PROCEDURE — 99024 POSTOP FOLLOW-UP VISIT: CPT

## 2018-07-25 ENCOUNTER — APPOINTMENT (OUTPATIENT)
Dept: BARIATRICS | Facility: CLINIC | Age: 58
End: 2018-07-25
Payer: COMMERCIAL

## 2018-07-25 VITALS
DIASTOLIC BLOOD PRESSURE: 72 MMHG | HEART RATE: 103 BPM | WEIGHT: 192.46 LBS | HEIGHT: 66 IN | SYSTOLIC BLOOD PRESSURE: 122 MMHG | BODY MASS INDEX: 30.93 KG/M2 | OXYGEN SATURATION: 97 %

## 2018-07-25 PROBLEM — E66.01 MORBID (SEVERE) OBESITY DUE TO EXCESS CALORIES: Chronic | Status: ACTIVE | Noted: 2018-06-24

## 2018-07-25 PROCEDURE — 99024 POSTOP FOLLOW-UP VISIT: CPT

## 2018-08-07 ENCOUNTER — CHART COPY (OUTPATIENT)
Age: 58
End: 2018-08-07

## 2018-08-08 ENCOUNTER — APPOINTMENT (OUTPATIENT)
Dept: BARIATRICS | Facility: CLINIC | Age: 58
End: 2018-08-08

## 2018-08-29 ENCOUNTER — APPOINTMENT (OUTPATIENT)
Dept: BARIATRICS | Facility: CLINIC | Age: 58
End: 2018-08-29
Payer: COMMERCIAL

## 2018-08-29 VITALS
HEART RATE: 84 BPM | BODY MASS INDEX: 31.34 KG/M2 | WEIGHT: 195 LBS | OXYGEN SATURATION: 96 % | DIASTOLIC BLOOD PRESSURE: 72 MMHG | HEIGHT: 66 IN | SYSTOLIC BLOOD PRESSURE: 126 MMHG

## 2018-08-29 DIAGNOSIS — R58 HEMORRHAGE, NOT ELSEWHERE CLASSIFIED: ICD-10-CM

## 2018-08-29 DIAGNOSIS — L76.32 POSTPROCEDURAL HEMATOMA OF SKIN AND SUBCUTANEOUS TISSUE FOLLOWING OTHER PROCEDURE: ICD-10-CM

## 2018-08-29 PROCEDURE — 99024 POSTOP FOLLOW-UP VISIT: CPT

## 2018-09-05 PROBLEM — R58 POSTOPERATIVE ECCHYMOSIS: Status: RESOLVED | Noted: 2018-07-05 | Resolved: 2018-09-05

## 2018-09-05 PROBLEM — L76.32 POSTOPERATIVE HEMATOMA OF SKIN FOLLOWING NON-DERMATOLOGIC PROCEDURE: Status: RESOLVED | Noted: 2018-07-25 | Resolved: 2018-09-05

## 2018-09-20 ENCOUNTER — APPOINTMENT (OUTPATIENT)
Dept: BARIATRICS | Facility: CLINIC | Age: 58
End: 2018-09-20
Payer: COMMERCIAL

## 2018-09-20 VITALS
OXYGEN SATURATION: 96 % | SYSTOLIC BLOOD PRESSURE: 128 MMHG | DIASTOLIC BLOOD PRESSURE: 92 MMHG | BODY MASS INDEX: 31.34 KG/M2 | HEIGHT: 66 IN | HEART RATE: 97 BPM | WEIGHT: 195 LBS

## 2018-09-20 DIAGNOSIS — Z98.84 BARIATRIC SURGERY STATUS: ICD-10-CM

## 2018-09-20 PROCEDURE — 99024 POSTOP FOLLOW-UP VISIT: CPT

## 2018-09-21 PROBLEM — Z98.84 HISTORY OF REMOVAL OF LAPAROSCOPIC GASTRIC BANDING DEVICE: Status: ACTIVE | Noted: 2018-07-10

## 2018-09-21 RX ORDER — ONDANSETRON 4 MG/1
4 TABLET, ORALLY DISINTEGRATING ORAL
Qty: 15 | Refills: 0 | Status: COMPLETED | COMMUNITY
Start: 2018-06-23

## 2018-09-21 RX ORDER — OXYCODONE AND ACETAMINOPHEN 5; 325 MG/1; MG/1
5-325 TABLET ORAL
Qty: 15 | Refills: 0 | Status: COMPLETED | COMMUNITY
Start: 2018-06-25

## 2019-02-28 ENCOUNTER — APPOINTMENT (OUTPATIENT)
Dept: ENDOCRINOLOGY | Facility: CLINIC | Age: 59
End: 2019-02-28
Payer: COMMERCIAL

## 2019-02-28 VITALS
HEART RATE: 92 BPM | DIASTOLIC BLOOD PRESSURE: 80 MMHG | BODY MASS INDEX: 32.14 KG/M2 | HEIGHT: 66 IN | RESPIRATION RATE: 16 BRPM | WEIGHT: 200 LBS | OXYGEN SATURATION: 98 % | SYSTOLIC BLOOD PRESSURE: 132 MMHG

## 2019-02-28 DIAGNOSIS — L65.9 NONSCARRING HAIR LOSS, UNSPECIFIED: ICD-10-CM

## 2019-02-28 PROCEDURE — 99244 OFF/OP CNSLTJ NEW/EST MOD 40: CPT

## 2019-02-28 NOTE — HISTORY OF PRESENT ILLNESS
[FreeTextEntry1] : 59 yo referred for AITD management. \par Patient reports a history of obesity, s/p lap band surgery placed about 10 years ago, resulting in more than 70 lbs weight loss. Unfortunately, she developed a prolapse, and had to undergo a band removal in 06/18. Subsequently she noticed some hair thinning and mild fatigue. Her TFT's were checked and she was found to have positive antibodies.\par She reports a history of "slightly underactive thyroid" about 3 years ago not requiring thyroid replacement.\par Denies family history of thyroid cancer or history of radiation exposure to head and neck area in a childhood. Her father had a history of hyperthyroidism. \par Labs from 11/26/18- TSH- 1.54, T3/T4 normal. TPO ab (234), Tg ab- <20\par negative RF/dsDNA/ SANJANA\par a1c-5.6

## 2019-02-28 NOTE — ASSESSMENT
[FreeTextEntry1] : Discussed with patient approached to AITD, and ways to decrease autoimmunity. \par - presently euthyroid, and I would not start on L-thyroxine therapy at present\par - repeat TFT's, antibodies, B12, folic acid, 25-D, iron levels\par - Thyroid US\par - If euthyroid, would continue to monitor closely thyroid functions\par RTC 2 weeks for results

## 2019-02-28 NOTE — CONSULT LETTER
[Dear  ___] : Dear  [unfilled], [Sincerely,] : Sincerely, [FreeTextEntry1] : Thank you for referring  Ms. RED DAILEY to me for evaluation and treatment. Please, see attached consultation note. As always, if there are specific questions you would like to discuss, please feel free to contact me.\par Thank you for the courtesy of this evaluation.\par  [FreeTextEntry3] : Amber Bradley MD, FACE, ECNU\par

## 2019-03-08 LAB
25(OH)D3 SERPL-MCNC: 33.6 NG/ML
BASOPHILS # BLD AUTO: 0.07 K/UL
BASOPHILS NFR BLD AUTO: 1.1 %
EOSINOPHIL # BLD AUTO: 0.12 K/UL
EOSINOPHIL NFR BLD AUTO: 1.9 %
HCT VFR BLD CALC: 41 %
HGB BLD-MCNC: 12.6 G/DL
IMM GRANULOCYTES NFR BLD AUTO: 0.2 %
IRON SATN MFR SERPL: 17 %
IRON SERPL-MCNC: 63 UG/DL
LYMPHOCYTES # BLD AUTO: 1.7 K/UL
LYMPHOCYTES NFR BLD AUTO: 27.4 %
MAN DIFF?: NORMAL
MCHC RBC-ENTMCNC: 29.6 PG
MCHC RBC-ENTMCNC: 30.7 GM/DL
MCV RBC AUTO: 96.5 FL
MONOCYTES # BLD AUTO: 0.4 K/UL
MONOCYTES NFR BLD AUTO: 6.4 %
NEUTROPHILS # BLD AUTO: 3.91 K/UL
NEUTROPHILS NFR BLD AUTO: 63 %
PLATELET # BLD AUTO: 353 K/UL
RBC # BLD: 4.25 M/UL
RBC # FLD: 13 %
TIBC SERPL-MCNC: 362 UG/DL
TSH SERPL-ACNC: 2.61 UIU/ML
UIBC SERPL-MCNC: 299 UG/DL
VIT B12 SERPL-MCNC: 771 PG/ML
WBC # FLD AUTO: 6.21 K/UL

## 2019-03-10 LAB
FOLATE RBC-MCNC: 2936 NG/ML
HCT VFR BLD CALC: 40.8 %
THYROGLOB AB SERPL-ACNC: <20 IU/ML
THYROGLOB SERPL-MCNC: 62.5 NG/ML
THYROPEROXIDASE AB SERPL IA-ACNC: 324 IU/ML

## 2019-03-11 ENCOUNTER — APPOINTMENT (OUTPATIENT)
Dept: ULTRASOUND IMAGING | Facility: CLINIC | Age: 59
End: 2019-03-11
Payer: COMMERCIAL

## 2019-03-11 ENCOUNTER — OUTPATIENT (OUTPATIENT)
Dept: OUTPATIENT SERVICES | Facility: HOSPITAL | Age: 59
LOS: 1 days | End: 2019-03-11
Payer: COMMERCIAL

## 2019-03-11 DIAGNOSIS — Z98.84 BARIATRIC SURGERY STATUS: Chronic | ICD-10-CM

## 2019-03-11 DIAGNOSIS — Z00.8 ENCOUNTER FOR OTHER GENERAL EXAMINATION: ICD-10-CM

## 2019-03-11 PROCEDURE — 76536 US EXAM OF HEAD AND NECK: CPT | Mod: 26

## 2019-03-11 PROCEDURE — 76536 US EXAM OF HEAD AND NECK: CPT

## 2019-03-21 ENCOUNTER — APPOINTMENT (OUTPATIENT)
Dept: ENDOCRINOLOGY | Facility: CLINIC | Age: 59
End: 2019-03-21
Payer: COMMERCIAL

## 2019-03-21 VITALS
HEART RATE: 80 BPM | OXYGEN SATURATION: 98 % | HEIGHT: 66 IN | BODY MASS INDEX: 31.5 KG/M2 | DIASTOLIC BLOOD PRESSURE: 75 MMHG | WEIGHT: 196 LBS | RESPIRATION RATE: 16 BRPM | SYSTOLIC BLOOD PRESSURE: 130 MMHG

## 2019-03-21 PROCEDURE — 99213 OFFICE O/P EST LOW 20 MIN: CPT

## 2019-03-21 NOTE — ASSESSMENT
[FreeTextEntry1] : Discussed with patient approached to AITD, and ways to decrease autoimmunity. \par - presently euthyroid, and I would not start on L-thyroxine therapy \par - continue to monitor closely thyroid functions, q 6 mos\par - advised to decr folic supplements\par RTC 6 mos, or sooner prn\par

## 2019-03-21 NOTE — HISTORY OF PRESENT ILLNESS
[FreeTextEntry1] : 59 yo female f/u for AITD management. \par Thyroid US (3/11/19)- heterogeneous thyroid with incr vasc , no nodules \par TSH- 2.61. + TPO ab\par folate- 2900\par \par HPI:\par Patient reports a history of obesity, s/p lap band surgery placed about 10 years ago, resulting in more than 70 lbs weight loss. Unfortunately, she developed a prolapse, and had to undergo a band removal in 06/18. Subsequently she noticed some hair thinning and mild fatigue. Her TFT's were checked and she was found to have positive antibodies.\par She reports a history of "slightly underactive thyroid" about 3 years ago not requiring thyroid replacement.\par Denies family history of thyroid cancer or history of radiation exposure to head and neck area in a childhood. Her father had a history of hyperthyroidism. \par Labs from 11/26/18- TSH- 1.54, T3/T4 normal. TPO ab (234), Tg ab- <20\par negative RF/dsDNA/ SANJANA\par a1c-5.6

## 2019-10-23 ENCOUNTER — APPOINTMENT (OUTPATIENT)
Dept: ENDOCRINOLOGY | Facility: CLINIC | Age: 59
End: 2019-10-23
Payer: COMMERCIAL

## 2019-10-23 VITALS
HEART RATE: 74 BPM | HEIGHT: 66 IN | RESPIRATION RATE: 16 BRPM | BODY MASS INDEX: 28.12 KG/M2 | WEIGHT: 175 LBS | OXYGEN SATURATION: 99 % | DIASTOLIC BLOOD PRESSURE: 80 MMHG | SYSTOLIC BLOOD PRESSURE: 120 MMHG

## 2019-10-23 PROCEDURE — 36415 COLL VENOUS BLD VENIPUNCTURE: CPT

## 2019-10-23 PROCEDURE — 99213 OFFICE O/P EST LOW 20 MIN: CPT | Mod: 25

## 2019-10-23 RX ORDER — OMEPRAZOLE 20 MG/1
20 CAPSULE, DELAYED RELEASE ORAL
Qty: 30 | Refills: 0 | Status: DISCONTINUED | COMMUNITY
Start: 2018-06-25 | End: 2019-10-23

## 2019-10-23 NOTE — ASSESSMENT
[FreeTextEntry1] : Discussed with patient approached to AITD, and ways to decrease autoimmunity. \par - presently euthyroid, and I would not start on L-thyroxine therapy \par - continue to monitor closely thyroid functions, q 6-12 mos\par - pt will lavinia for results. If all stable, can f/u annually

## 2020-04-02 NOTE — H&P ADULT - NEGATIVE GASTROINTESTINAL SYMPTOMS
Bed: 27  Expected date:   Expected time:   Means of arrival:   Comments:  [facility] [address] [facility]  04/01/20 2040 no hematochezia/no steatorrhea/no hiccoughs/no diarrhea/no constipation/no change in bowel habits/no flatulence/no melena/no jaundice

## 2020-06-24 ENCOUNTER — TRANSCRIPTION ENCOUNTER (OUTPATIENT)
Age: 60
End: 2020-06-24

## 2020-07-06 ENCOUNTER — TRANSCRIPTION ENCOUNTER (OUTPATIENT)
Age: 60
End: 2020-07-06

## 2021-05-06 NOTE — H&P ADULT - HISTORY OF PRESENT ILLNESS
58y woman presents to ED late last evening with complaints of 4 days of not feeling well with nausea and vomiting.  Thought she had a stomach virus.  Similar episode, self limited one year ago.  Was seen in urgent care and referred to ED for further evaluation given Lap Band History.
07-May-2021 01:29

## 2021-09-28 ENCOUNTER — APPOINTMENT (OUTPATIENT)
Dept: ENDOCRINOLOGY | Facility: CLINIC | Age: 61
End: 2021-09-28
Payer: MEDICAID

## 2021-09-28 VITALS
DIASTOLIC BLOOD PRESSURE: 80 MMHG | HEIGHT: 66 IN | BODY MASS INDEX: 32.14 KG/M2 | RESPIRATION RATE: 16 BRPM | HEART RATE: 83 BPM | TEMPERATURE: 98.5 F | WEIGHT: 200 LBS | OXYGEN SATURATION: 98 % | SYSTOLIC BLOOD PRESSURE: 130 MMHG

## 2021-09-28 DIAGNOSIS — Z86.39 PERSONAL HISTORY OF OTHER ENDOCRINE, NUTRITIONAL AND METABOLIC DISEASE: ICD-10-CM

## 2021-09-28 DIAGNOSIS — E66.9 OBESITY, UNSPECIFIED: ICD-10-CM

## 2021-09-28 PROCEDURE — 99214 OFFICE O/P EST MOD 30 MIN: CPT | Mod: 25

## 2021-09-28 NOTE — HISTORY OF PRESENT ILLNESS
[FreeTextEntry1] : 62 yo female f/u for AITD management. \par \par *** Sep 28, 2021 ***\par \par last visit in 2019. under stress- taking of a sick father. regained some weight- less active\par no new meds\par feels fine, no c/o\par no recent labs\par \par *** Oct 23, 2019 ***\par \par feels well. Lost 10 lbs on the diet\par no recent labs\par \par \par *** Mar 21, 2019 ***\par \par Thyroid US (3/11/19)- heterogeneous thyroid with incr vasc , no nodules \par TSH- 2.61. + TPO ab\par folate- 2900\par \par HPI:\par Patient reports a history of obesity, s/p lap band surgery placed about 10 years ago, resulting in more than 70 lbs weight loss. Unfortunately, she developed a prolapse, and had to undergo a band removal in 06/18. Subsequently she noticed some hair thinning and mild fatigue. Her TFT's were checked and she was found to have positive antibodies.\par She reports a history of "slightly underactive thyroid" about 3 years ago not requiring thyroid replacement.\par Denies family history of thyroid cancer or history of radiation exposure to head and neck area in a childhood. Her father had a history of hyperthyroidism. \par Labs from 11/26/18- TSH- 1.54, T3/T4 normal. TPO ab (234), Tg ab- <20\par negative RF/dsDNA/ SANJANA\par a1c-5.6

## 2021-10-06 LAB
COVID-19 SPIKE DOMAIN ANTIBODY INTERPRETATION: POSITIVE
SARS-COV-2 AB SERPL IA-ACNC: >250 U/ML
T4 FREE SERPL-MCNC: 1 NG/DL
THYROGLOB AB SERPL-ACNC: <20 IU/ML
THYROPEROXIDASE AB SERPL IA-ACNC: 169 IU/ML
TSH SERPL-ACNC: 3.59 UIU/ML

## 2022-03-02 NOTE — DISCHARGE NOTE ADULT - HOSPITAL COURSE
room air
58 year old female with PMHx depression PSHx lap band (2009), tonsillectomy presented to the ED complaining of abdominal pain and vomiting after eating and drinking x 4 days. CT abdomen found slipped lap band and mild inflammation around the gastric pouch. Patient had urgent laparoscopic removal of lap band and port. Post operatively patient did well, good urine output and ambulating well on floor. POD #1, VE showed no extravasation and then patient tolerated advancement of diet to bariatric clears.  She will remain on full liquid diet until follow up appointment with Dr. Lundy next week. Patient felt ready for discharge to home.

## 2022-11-13 NOTE — ED PROVIDER NOTE - CPE EDP SKIN NORM
DISCUSSED HEALTH ISSUES  HEALTHY DIET AND EXERCISE  BW WILL BE REVIEWED  MAMMOGRAPHY   RECOMMEND CALCIUM 4795-2889 MG DAILY  VITAMIN D3  1000 IU DAILY  RV IN 1 YEAR FOR ANNUAL EXAM, SOONER IF NEEDED    RV 6M      Recent Results (from the past 6048 hour(s))   CBC and differential    Collection Time: 09/15/22  3:07 PM   Result Value Ref Range    White Blood Cell Count 9 5 3 8 - 10 8 Thousand/uL    Red Blood Cell Count 4 65 3 80 - 5 10 Million/uL    Hemoglobin 14 0 11 7 - 15 5 g/dL    HCT 42 4 35 0 - 45 0 %    MCV 91 2 80 0 - 100 0 fL    MCH 30 1 27 0 - 33 0 pg    MCHC 33 0 32 0 - 36 0 g/dL    RDW 12 8 11 0 - 15 0 %    Platelet Count 854 614 - 400 Thousand/uL    SL AMB MPV 10 8 7 5 - 12 5 fL    Neutrophils (Absolute) 6,337 1,500 - 7,800 cells/uL    Lymphocytes (Absolute) 2,309 850 - 3,900 cells/uL    Monocytes (Absolute) 561 200 - 950 cells/uL    Eosinophils (Absolute) 228 15 - 500 cells/uL    Basophils ABS 67 0 - 200 cells/uL    Neutrophils 66 7 %    Lymphocytes 24 3 %    Monocytes 5 9 %    Eosinophils 2 4 %    Basophils PCT 0 7 %   Comprehensive metabolic panel    Collection Time: 09/15/22  3:07 PM   Result Value Ref Range    Glucose, Random 86 65 - 99 mg/dL    BUN 19 7 - 25 mg/dL    Creatinine 0 71 0 50 - 1 03 mg/dL    eGFR 100 > OR = 60 mL/min/1 73m2    SL AMB BUN/CREATININE RATIO NOT APPLICABLE 6 - 22 (calc)    Sodium 139 135 - 146 mmol/L    Potassium 4 1 3 5 - 5 3 mmol/L    Chloride 100 98 - 110 mmol/L    CO2 28 20 - 32 mmol/L    Calcium 10 4 8 6 - 10 4 mg/dL    Protein, Total 7 6 6 1 - 8 1 g/dL    Albumin 4 8 3 6 - 5 1 g/dL    Globulin 2 8 1 9 - 3 7 g/dL (calc)    Albumin/Globulin Ratio 1 7 1 0 - 2 5 (calc)    TOTAL BILIRUBIN 0 7 0 2 - 1 2 mg/dL    Alkaline Phosphatase 76 37 - 153 U/L    AST 17 10 - 35 U/L    ALT 20 6 - 29 U/L   TSH, 3rd generation    Collection Time: 09/15/22  3:07 PM   Result Value Ref Range    TSH 0 67 mIU/L   Lipid panel    Collection Time: 09/15/22  3:07 PM   Result Value Ref Range    Total Cholesterol 238 (H) <200 mg/dL    HDL 51 > OR = 50 mg/dL    Triglycerides 146 <150 mg/dL    LDL Calculated 159 (H) mg/dL (calc)    Chol HDLC Ratio 4 7 <5 0 (calc)    Non-HDL Cholesterol 187 (H) <130 mg/dL (calc)   Hepatitis C Ab W/Refl To HCV RNA, Qn, PCR    Collection Time: 09/15/22  3:07 PM   Result Value Ref Range    HEP C AB NON-REACTIVE NON-REACTIVE    Signal to Cut-Off 0 04 <1 00   Human Immunodeficiency Virus 1/2 Antigen / Antibody ( Fourth Generation) with Reflex Testing    Collection Time: 09/15/22  3:07 PM   Result Value Ref Range    HIV AG/AB, 4th Gen NON-REACTIVE NON-REACTIVE normal...

## 2022-12-08 ENCOUNTER — NON-APPOINTMENT (OUTPATIENT)
Age: 62
End: 2022-12-08

## 2023-05-02 NOTE — PATIENT PROFILE ADULT. - NSTOBACCONEVERSMOKERY/N_GEN_A
EMERGENCY DEPARTMENT HISTORY AND PHYSICAL EXAM    8:22 PM      Date: 5/1/2023  Patient Name: Laurel Adams    History of Presenting Illness     Chief Complaint   Patient presents with    Dizziness         History Provided By: the patient. Additional History (Context): Laurel Adams is a 30 y.o. female with   Past Medical History:   Diagnosis Date    Asthma     uses albuterol inhaler (2 Puffs)    Diabetic eye exam ([facility] 04/12/2018    DKA (diabetic ketoacidoses) 2/28/2018    Headache     Type 2 diabetes with nephropathy ([facility] [address] 5/22/2018    who presents with complaint of lightheadedness and generalized fatigue x1 day. Patient is concerned her blood sugar is causing the symptoms. Pt did not check glucose PTA. Patient denies dizziness, changes in vision, chest pain, shortness of breath, abdominal pain, nausea or vomiting, diarrhea, dysuria, hematuria. Denies exacerbating or alleviating factors.      PCP: PROVIDER UNKNOWN, AGPCNP    Current Facility-Administered Medications   Medication Dose Route Frequency Provider Last Rate Last Admin    [COMPLETED] 0.9 % sodium chloride bolus  1,000 mL IntraVENous Once Laurel Angulo 495.9 mL/hr at 05/01/23 1923 1,000 mL at 05/01/23 1923     Current Outpatient Medications   Medication Sig Dispense Refill    cephALEXin (KEFLEX) 500 MG capsule Take 1 capsule by mouth 2 times daily for 7 days 14 capsule 0    LANTUS SOLOSTAR 100 UNIT/ML injection pen Inject 21 Units into the skin nightly      insulin lispro (HUMALOG) 100 UNIT/ML injection vial Inject 28 Units into the skin 3 times daily (before meals)      albuterol sulfate HFA (PROVENTIL;VENTOLIN;PROAIR) 108 (90 Base) MCG/ACT inhaler Inhale 1-2 puffs into the lungs every 4 hours as needed         Past History     Past Medical History:  Past Medical History:   Diagnosis Date    Asthma     uses albuterol inhaler (2 Puffs)    Diabetic eye exam ([facility] [address] 04/12/2018    DKA (diabetic ketoacidoses) 2/28/2018    Headache     Type 2
No

## 2024-01-02 ENCOUNTER — NON-APPOINTMENT (OUTPATIENT)
Age: 64
End: 2024-01-02

## 2024-01-02 DIAGNOSIS — Z81.8 FAMILY HISTORY OF OTHER MENTAL AND BEHAVIORAL DISORDERS: ICD-10-CM

## 2024-01-02 DIAGNOSIS — Z83.719 FAMILY HISTORY OF COLON POLYPS, UNSPECIFIED: ICD-10-CM

## 2024-01-02 DIAGNOSIS — F32.A DEPRESSION, UNSPECIFIED: ICD-10-CM

## 2024-01-02 RX ORDER — TRAZODONE HYDROCHLORIDE 100 MG/1
100 TABLET ORAL
Refills: 0 | Status: ACTIVE | COMMUNITY

## 2024-01-02 RX ORDER — SEMAGLUTIDE 0.25 MG/.5ML
0.25 INJECTION, SOLUTION SUBCUTANEOUS
Refills: 0 | Status: ACTIVE | COMMUNITY

## 2024-01-02 RX ORDER — VENLAFAXINE HYDROCHLORIDE 75 MG/1
75 CAPSULE, EXTENDED RELEASE ORAL
Refills: 0 | Status: ACTIVE | COMMUNITY

## 2024-01-05 NOTE — DISCHARGE NOTE ADULT - FUNCTIONAL SCREEN CURRENT LEVEL: AMBULATION, MLM
Patient returned a call he missed. I advised he needs lab work for future refills. Patient will go to a [facility] lab to have them drawn.    Electronically signed by Andra Hanna MA on 1/5/2024 at 11:36 AM     (0) independent

## 2024-03-06 ENCOUNTER — APPOINTMENT (OUTPATIENT)
Dept: INTERNAL MEDICINE | Facility: CLINIC | Age: 64
End: 2024-03-06

## 2025-07-17 NOTE — HISTORY OF PRESENT ILLNESS
[FreeTextEntry1] : 60 yo female f/u for AITD management. \par \par *** Oct 23, 2019 ***\par \par feels well. Lost 10 lbs on the diet\par no recent labs\par \par \par *** Mar 21, 2019 ***\par \par Thyroid US (3/11/19)- heterogeneous thyroid with incr vasc , no nodules \par TSH- 2.61. + TPO ab\par folate- 2900\par \par HPI:\par Patient reports a history of obesity, s/p lap band surgery placed about 10 years ago, resulting in more than 70 lbs weight loss. Unfortunately, she developed a prolapse, and had to undergo a band removal in 06/18. Subsequently she noticed some hair thinning and mild fatigue. Her TFT's were checked and she was found to have positive antibodies.\par She reports a history of "slightly underactive thyroid" about 3 years ago not requiring thyroid replacement.\par Denies family history of thyroid cancer or history of radiation exposure to head and neck area in a childhood. Her father had a history of hyperthyroidism. \par Labs from 11/26/18- TSH- 1.54, T3/T4 normal. TPO ab (234), Tg ab- <20\par negative RF/dsDNA/ SANJANA\par a1c-5.6 No